# Patient Record
Sex: FEMALE
[De-identification: names, ages, dates, MRNs, and addresses within clinical notes are randomized per-mention and may not be internally consistent; named-entity substitution may affect disease eponyms.]

---

## 2018-04-30 ENCOUNTER — HOSPITAL ENCOUNTER (EMERGENCY)
Dept: HOSPITAL 31 - C.ER | Age: 33
Discharge: HOME | End: 2018-04-30
Payer: COMMERCIAL

## 2018-04-30 VITALS — BODY MASS INDEX: 32.5 KG/M2

## 2018-04-30 VITALS — OXYGEN SATURATION: 98 % | RESPIRATION RATE: 18 BRPM

## 2018-04-30 VITALS — DIASTOLIC BLOOD PRESSURE: 74 MMHG | HEART RATE: 78 BPM | SYSTOLIC BLOOD PRESSURE: 136 MMHG | TEMPERATURE: 98 F

## 2018-04-30 DIAGNOSIS — O26.91: Primary | ICD-10-CM

## 2018-04-30 DIAGNOSIS — Z3A.01: ICD-10-CM

## 2018-04-30 DIAGNOSIS — R10.2: ICD-10-CM

## 2018-04-30 LAB
ALBUMIN SERPL-MCNC: 4 G/DL (ref 3.5–5)
ALBUMIN/GLOB SERPL: 1.2 {RATIO} (ref 1–2.1)
ALT SERPL-CCNC: 14 U/L (ref 9–52)
AST SERPL-CCNC: 25 U/L (ref 14–36)
BACTERIA #/AREA URNS HPF: (no result) /[HPF]
BASOPHILS # BLD AUTO: 0 K/UL (ref 0–0.2)
BASOPHILS NFR BLD: 0.1 % (ref 0–2)
BILIRUB UR-MCNC: NEGATIVE MG/DL
BUN SERPL-MCNC: 9 MG/DL (ref 7–17)
CALCIUM SERPL-MCNC: 8.9 MG/DL (ref 8.6–10.4)
EOSINOPHIL # BLD AUTO: 0 K/UL (ref 0–0.7)
EOSINOPHIL NFR BLD: 0.2 % (ref 0–4)
ERYTHROCYTE [DISTWIDTH] IN BLOOD BY AUTOMATED COUNT: 13.6 % (ref 11.5–14.5)
GFR NON-AFRICAN AMERICAN: > 60
GLUCOSE UR STRIP-MCNC: NORMAL MG/DL
HCG,QUALITATIVE URINE: POSITIVE
HGB BLD-MCNC: 13.7 G/DL (ref 11–16)
LEUKOCYTE ESTERASE UR-ACNC: (no result) LEU/UL
LYMPHOCYTE: 5 % (ref 20–40)
LYMPHOCYTES # BLD AUTO: 0.6 K/UL (ref 1–4.3)
LYMPHOCYTES NFR BLD AUTO: 6.2 % (ref 20–40)
MCH RBC QN AUTO: 31.3 PG (ref 27–31)
MCHC RBC AUTO-ENTMCNC: 36 G/DL (ref 33–37)
MCV RBC AUTO: 87 FL (ref 81–99)
MONOCYTE: 6 % (ref 0–10)
MONOCYTES # BLD: 0.8 K/UL (ref 0–0.8)
MONOCYTES NFR BLD: 8.5 % (ref 0–10)
NEUTROPHILS # BLD: 7.6 K/UL (ref 1.8–7)
NEUTROPHILS NFR BLD AUTO: 85 % (ref 50–75)
NEUTROPHILS NFR BLD AUTO: 88 % (ref 50–75)
NEUTS BAND NFR BLD: 1 % (ref 0–2)
NRBC BLD AUTO-RTO: 0 % (ref 0–2)
PH UR STRIP: 5 [PH] (ref 5–8)
PLATELET # BLD EST: NORMAL 10*3/UL
PLATELET # BLD: 145 K/UL (ref 130–400)
PMV BLD AUTO: 9.8 FL (ref 7.2–11.7)
PROT UR STRIP-MCNC: NEGATIVE MG/DL
RBC # BLD AUTO: 4.38 MIL/UL (ref 3.8–5.2)
RBC # UR STRIP: (no result) /UL
RBC MORPH BLD: NORMAL
SP GR UR STRIP: 1.02 (ref 1–1.03)
SQUAMOUS EPITHIAL: 4 /HPF (ref 0–5)
TOTAL CELLS COUNTED BLD: 100
URINE AMORPHOUS SEDIMENT: (no result) /UL
UROBILINOGEN UR-MCNC: NORMAL MG/DL (ref 0.2–1)
WBC # BLD AUTO: 8.9 K/UL (ref 4.8–10.8)

## 2018-04-30 NOTE — US
PROCEDURE:  First trimester fetal ultrasound presenting with pain 



Beta HCG results: Pending 



HISTORY:

Right pubic pain, preg, no bleeding



COMPARISON:

None



TECHNIQUE:

Standard protocol for this study/examination.



FINDINGS:

LMP: 03/05/2018



Prior examinations from the current pregnancy: None



TECHNIQUE: Real-time 2D imaging, duplex and color Doppler.



FINDINGS:



Cardiac activity: Present



Rate: 169 BPM



Measurements:



Crown rump length: 160   cm



Gestational age based on CRL   8 weeks  



Gestational age 7 weeks 4 days based on gestational sac measurement 

2.72 cm



Gestational age derived from  LMP:   8 weeks



AMIRAH based on LMP: 12/10/2018



AMIRAH based on biometry: 12/11/2018



Gestational concordance documented



Yolk sac  identified 



Uterus: Unremarkable. 



Cervix: No Cervical abnormalities: Negative examination for cervical 

dilatation or effacement. 



Closed cervix measuring 3.08 cm



Subchorionic hemorrhage: None cysts (2). The larger measures 2.5 x 

3.2 x 2.8 cm. 



UTERUS: 7.8 x 8.5 x 10.1 cm. Adnexal 



ADNEXA:



Right: 3.7 x 4.8 x 5.4 cm.   Normal Doppler arterial waveform 

documented.



Left:   1.1 x 2.5 x 2.3 cm.  Normal Doppler arterial waveform 

documented



Fluid in the cul-de-sac: 



IMPRESSION:

Seven weeks 6 days live intrauterine gestation.  Gestational 

concordance documented.

## 2018-04-30 NOTE — C.PDOC
History Of Present Illness


33-year-old female, presents to the emergency department with complaints of 

pelvic cramping. Patient is reportedly 8 weeks pregnant (), LMP 3/5


comes in complaining of pelvic cramping pain since last night, which is worse 

on right, Pain is intermittent in nature and non-radiating. Patient notes 

associated low grade fever last night, and reports a slight headache and non-

productive cough x2 days. Patient denies bleeding, hematuria. 


Time Seen by Provider: 18 09:45


Chief Complaint (Nursing): Fever


History Per: Patient


History/Exam Limitations: no limitations


Onset/Duration Of Symptoms: Days


Current Symptoms Are (Timing): Still Present


Severity: Moderate





Past Medical History


Reviewed: Historical Data, Nursing Documentation, Vital Signs


Vital Signs: 


 Last Vital Signs











Temp  99.5 F   18 09:10


 


Pulse  99 H  18 09:10


 


Resp  18   18 09:10


 


BP  119/81   18 09:10


 


Pulse Ox  98   18 10:50














- Medical History


PMH: Gastrointestinal Ulcer (??), Gall Bladder Disease (gallstones)


Family History: States: No Known Family Hx





- Social History


Hx Tobacco Use: No


Hx Alcohol Use: No


Hx Substance Use: No





- Immunization History


Hx Tetanus Toxoid Vaccination: No


Hx Influenza Vaccination: No


Hx Pneumococcal Vaccination: No





Review Of Systems


Constitutional: Positive for: Fever


Respiratory: Positive for: Cough.  Negative for: Sputum


Gastrointestinal: Negative for: Vomiting


Genitourinary: Positive for: Pelvic Pain.  Negative for: Dysuria, Vaginal 

Discharge, Vaginal Bleeding


Neurological: Positive for: Headache





Physical Exam





- Physical Exam


Appears: Non-toxic, No Acute Distress


Skin: Normal Color, Warm, Dry, No Rash


Head: Normacephalic


Eye(s): bilateral: PERRL


Oral Mucosa: Moist


Lips: Normal Appearing


Neck: Normal ROM


Cardiovascular: Rhythm Regular, No Murmur


Respiratory: Normal Breath Sounds, No Accessory Muscle Use


Gastrointestinal/Abdominal: Soft, Tenderness (suprapubic, R>L)


Extremity: Normal ROM, No Deformity, No Swelling


Neurological/Psych: Oriented x3, Normal Speech





ED Course And Treatment





- Laboratory Results


Result Diagrams: 


 18 09:57





 18 09:57


O2 Sat by Pulse Oximetry: 98 (RA)


Pulse Ox Interpretation: Normal





- CT Scan/US


  ** OB transvaginal 


Other Rad Studies (CT/US): Read By Radiologist, Radiology Report Reviewed


CT/US Interpretation: Accession No. : [id number].  Patient Name / ID : 

PETE GOFF  / [id number].  Exam Date : 2018 11:13:23 ( Approved ).  Study 

Comment :  Sex / Age : F  / 33Y.  Creator : Riky Ann MD.  Dictator 

: Riky Ann MD.   :  Approver : Riky Ann MD.  

Approver2 :  Report Date : 2018 12:02:48.  My Comment :  *****************

******************************************************************.  PROCEDURE:

  First trimester fetal ultrasound presenting with pain.  Beta HCG results: 

Pending.  HISTORY:  Right pubic pain, preg, no bleeding.  COMPARISON:  None.  

TECHNIQUE:  Standard protocol for this study/examination.  FINDINGS:  LMP: .  Prior examinations from the current pregnancy: None.  TECHNIQUE: Real-

time 2D imaging, duplex and color Doppler.  FINDINGS:  Cardiac activity: 

Present.  Rate: 169 BPM.  Measurements:  Crown rump length: 160   cm.  

Gestational age based on CRL   8 weeks.  Gestational age 7 weeks 4 days based 

on gestational sac measurement 2.72 cm.  Gestational age derived from  LMP:   8 

weeks.  AMIRAH based on LMP: 12/10/2018.  AMIRAH based on biometry: 2018.  

Gestational concordance documented.  Yolk sac  identified.  Uterus: 

Unremarkable.  Cervix: No Cervical abnormalities: Negative examination for 

cervical dilatation or effacement.  Closed cervix measuring 3.08 cm.  

Subchorionic hemorrhage: None cysts (2). The larger measures 2.5 x 3.2 x 2.8 

cm.  UTERUS: 7.8 x 8.5 x 10.1 cm. Adnexal.  ADNEXA:  Right: 3.7 x 4.8 x 5.4 cm.

   Normal Doppler arterial waveform documented.  Left:   1.1 x 2.5 x 2.3 cm.  

Normal Doppler arterial waveform documented.  Fluid in the cul-de-sac:  

IMPRESSION:  Seven weeks 6 days live intrauterine gestation.  Gestational 

concordance documented.





Medical Decision Making


Medical Decision Making: 


Impression: pelvic pain


Differential diagnosis includes but not limited to: ectopic pregnancy, 

threatened , cystitis





Plan:


* Labs


* CBC


* Tylenol


* Urine Culture








Progress:


US shows Seven weeks 6 days live [place]ine gestation. [facility] [place] [address]


Patient remained afebrile and on re-evaluation she was sitting comfortably and 

reports pain has resolved. Vital signs stable. Discussed results with patient, 

and copy of lab and US report was provided. Patient feels comfortable going 

home and will be discharged. Patient given follow up instructions to see ob/gyn 

within one week. Instructed to return to ER if symptoms worsen or new symptoms 

arise.








Disposition


Counseled Patient/Family Regarding: Diagnosis, Need For Followup





- Disposition


Referrals: 


Nathan Chawla MD [Staff Provider] - 


Disposition: HOME/ ROUTINE


Disposition Time: 12:30


Condition: IMPROVED


Additional Instructions: 


Your labs were normal and Ultrasound shows pregnancy 7 weeks 6 days


Follow up with your ob/gyn in timely manner


Take Tylenol for any pain


Return to the emergency department at any time if symptoms persist or worsen.


Instructions:  Threatened Miscarriage (DC)


Forms:  [facility] (English)





- POA


Present On Arrival: None





- Clinical Impression


Clinical Impression: 


 Pelvic pain during pregnancy








- Scribe Statement


The provider has reviewed the documentation as recorded by the Scribe (Crista Forde)


All medical record entries made by the Scribe were at my direction and 

personally dictated by me. I have reviewed the chart and agree that the record 

accurately reflects my personal performance of the history, physical exam, 

medical decision making, and the department course for this patient. I have 

also personally directed, reviewed, and agree with the discharge instructions 

and disposition.

## 2018-04-30 NOTE — C.PDOC
Time Seen by Provider: 04/30/18 09:45


Chief Complaint (Nursing): Fever





Past Medical History


Vital Signs: 





 Last Vital Signs











Temp  99.5 F   04/30/18 09:10


 


Pulse  99 H  04/30/18 09:10


 


Resp  18   04/30/18 09:10


 


BP  119/81   04/30/18 09:10


 


Pulse Ox  98   04/30/18 09:10














- Medical History


PMH: Gastrointestinal Ulcer (??), Gall Bladder Disease (gallstones)


Family History: States: Unknown Family Hx





- Social History


Hx Tobacco Use: No


Hx Alcohol Use: No


Hx Substance Use: No





- Immunization History


Hx Tetanus Toxoid Vaccination: No


Hx Influenza Vaccination: No


Hx Pneumococcal Vaccination: No





ED Course And Treatment


O2 Sat by Pulse Oximetry: 98





Disposition





- Disposition

## 2018-07-06 ENCOUNTER — HOSPITAL ENCOUNTER (EMERGENCY)
Dept: HOSPITAL 31 - C.ER | Age: 33
Discharge: HOME | End: 2018-07-06
Payer: SELF-PAY

## 2018-07-06 VITALS
RESPIRATION RATE: 18 BRPM | TEMPERATURE: 98.4 F | SYSTOLIC BLOOD PRESSURE: 124 MMHG | DIASTOLIC BLOOD PRESSURE: 85 MMHG | HEART RATE: 75 BPM

## 2018-07-06 VITALS — BODY MASS INDEX: 34 KG/M2

## 2018-07-06 VITALS — OXYGEN SATURATION: 99 %

## 2018-07-06 DIAGNOSIS — O02.1: Primary | ICD-10-CM

## 2018-07-06 LAB
ALBUMIN SERPL-MCNC: 4.2 [, G/DL] (ref 3.5–5)
ALBUMIN/GLOB SERPL: 1.4 [,] (ref 1–2.1)
ALT SERPL-CCNC: 21 [, U/L] (ref 9–52)
AST SERPL-CCNC: 22 [, U/L] (ref 14–36)
BACTERIA #/AREA URNS HPF: (no result) [,]
BASOPHILS # BLD AUTO: 0 [, K/UL] (ref 0–0.2)
BASOPHILS NFR BLD: 0.3 [, %] (ref 0–2)
BILIRUB UR-MCNC: NEGATIVE [,]
BUN SERPL-MCNC: 8 [, MG/DL] (ref 7–17)
CALCIUM SERPL-MCNC: 9.1 [, MG/DL] (ref 8.6–10.4)
EOSINOPHIL # BLD AUTO: 0.1 [, K/UL] (ref 0–0.7)
EOSINOPHIL NFR BLD: 1.1 [, %] (ref 0–4)
ERYTHROCYTE [DISTWIDTH] IN BLOOD BY AUTOMATED COUNT: 13.3 [, %] (ref 11.5–14.5)
GFR NON-AFRICAN AMERICAN: > 60 [,]
GLUCOSE UR STRIP-MCNC: NORMAL [, MG/DL]
HGB BLD-MCNC: 13 [, G/DL] (ref 11–16)
LEUKOCYTE ESTERASE UR-ACNC: (no result) [, LEU/UL]
LYMPHOCYTES # BLD AUTO: 1.9 [, K/UL] (ref 1–4.3)
LYMPHOCYTES NFR BLD AUTO: 24 [, %] (ref 20–40)
MCH RBC QN AUTO: 30.7 [, PG] (ref 27–31)
MCHC RBC AUTO-ENTMCNC: 35.2 [, G/DL] (ref 33–37)
MCV RBC AUTO: 87 [, FL] (ref 81–99)
MONOCYTES # BLD: 0.5 [, K/UL] (ref 0–0.8)
MONOCYTES NFR BLD: 6.3 [, %] (ref 0–10)
NEUTROPHILS # BLD: 5.4 [, K/UL] (ref 1.8–7)
NEUTROPHILS NFR BLD AUTO: 68.3 [, %] (ref 50–75)
NRBC BLD AUTO-RTO: 0 [, %] (ref 0–2)
PH UR STRIP: 5 [,] (ref 5–8)
PLATELET # BLD: 171 [, K/UL] (ref 130–400)
PMV BLD AUTO: 9.6 [, FL] (ref 7.2–11.7)
PROT UR STRIP-MCNC: NEGATIVE [, MG/DL]
RBC # BLD AUTO: 4.24 [, MIL/UL] (ref 3.8–5.2)
RBC # UR STRIP: (no result) [,]
SP GR UR STRIP: 1.01 [,] (ref 1–1.03)
SQUAMOUS EPITHIAL: 21 [, /HPF] (ref 0–5)
UROBILINOGEN UR-MCNC: NORMAL [, MG/DL] (ref 0.2–1)
WBC # BLD AUTO: 7.9 [, K/UL] (ref 4.8–10.8)

## 2018-07-06 NOTE — C.PDOC
History Of Present Illness





<Elmira Pelaez TOD - Last Filed: 18 14:51>





<Tori Hameed - Last Filed: 18 15:44>


32 y/o female  currently 17 weeks pregnant presents to ED sent from clinic 

after Dr. Chawla was unable to obtain fetal heart tones. At ED patient reports 

vaginal spotting "for few days" and denies abdominal pain, nausea, vomiting, 

back pain, vaginal discharge or any other complaints at this time.  (Elmira Pelaez)


History Per: Patient


History/Exam Limitations: no limitations


Onset/Duration Of Symptoms: Days


Current Symptoms Are (Timing): Still Present





<PelaezElmira TOD - Last Filed: 18 14:51>





<Tori Hameed - Last Filed: 18 15:44>


Time Seen by Provider: 18 12:17


Chief Complaint (Nursing): Female Genitourinary





Past Medical History


Reviewed: Historical Data, Nursing Documentation, Vital Signs





- Medical History


PMH: Gastrointestinal Ulcer (??), Gall Bladder Disease (gallstones)


Surgical History: No Surg Hx


Family History: States: No Known Family Hx





- Social History


Hx Tobacco Use: No


Hx Alcohol Use: No


Hx Substance Use: No





- Immunization History


Hx Tetanus Toxoid Vaccination: No


Hx Influenza Vaccination: No


Hx Pneumococcal Vaccination: No





<PelaezElmira toro - Last Filed: 18 14:51>


Vital Signs: 





 Last Vital Signs











Temp  98.4 F   18 14:35


 


Pulse  75   18 14:35


 


Resp  18   18 14:35


 


BP  124/85   18 14:35


 


Pulse Ox  99   18 14:53














Review Of Systems


Constitutional: Negative for: Fever, Chills


Gastrointestinal: Negative for: Nausea, Vomiting, Abdominal Pain


Genitourinary: Positive for: Vaginal Bleeding.  Negative for: Dysuria


Skin: Negative for: Rash





<PelaezElmira TOD - Last Filed: 18 14:51>





Physical Exam





- Physical Exam


Appears: Non-toxic, No Acute Distress


Skin: Warm, Dry, No Rash


Head: Atraumatic, Normacephalic


Eye(s): bilateral: Normal Inspection


Oral Mucosa: Moist


Neck: Normal ROM, Supple


Cardiovascular: Rhythm Regular


Respiratory: Normal Breath Sounds, No Rales, No Rhonchi, No Wheezing


Gastrointestinal/Abdominal: Soft, No Tenderness, No Guarding, No Rebound


Back: No CVA Tenderness, No Paraspinal Tenderness


Extremity: Bilateral: Atraumatic


Neurological/Psych: Oriented x3, Normal Speech, Normal Cognition





<BenignoElmira - Last Filed: 18 14:51>





ED Course And Treatment





- Laboratory Results


Result Diagrams: 


 18 12:38





 18 12:38


O2 Sat by Pulse Oximetry: 99 (RA)


Pulse Ox Interpretation: Normal





<Elmira Pelaez - Last Filed: 18 14:51>





- Laboratory Results


Result Diagrams: 


 18 12:38





 18 12:38





<Tori Hameed - Last Filed: 18 15:44>





Medical Decision Making





<Elmira Pelaez - Last Filed: 18 14:51>





<Tori Hameed - Last Filed: 18 15:44>


Medical Decision Making: 


Impression: 





Plan:


* Beta HCG


* UA


* OB pregnancy US


* IV fluids 








Progress:  (Elmira Pelaez)





Disposition


Discussed With : Tori Hameed (patient stable for discharge and outpatient D&C)


Counseled Patient/Family Regarding: Diagnosis, Need For Followup





- Disposition


Disposition Time: 13:54





- POA


Present On Arrival: None





<Elmira Pelaez - Last Filed: 18 14:51>





<Tori Hameed - Last Filed: 18 15:44>





- Disposition


Referrals: 


[facility] [Outside]


Disposition: HOME/ ROUTINE


Condition: STABLE


Additional Instructions: 


Please follow up outpatient as discussed with OB/GYN for D&C


Take Tylenol or Motrin for any pain and allow rest


Return to the emergency department at any time if symptoms persist or worsen.


Instructions:  Miscarriage (DC)


Forms:  [facility] (English)





- Clinical Impression


Clinical Impression: 


 Fetal demise, less than 22 weeks








- PA / NP / Resident Statement


MD/DO has reviewed & agrees with the documentation as recorded.





- Scribe Statement


The provider has reviewed the documentation as recorded by the Scribe





<Elmira Pelaez - Last Filed: 18 14:51>





<ZariTori - Last Filed: 18 15:44>





- Scribe Statement


Ivonne Alfaro





All medical record entries made by the Scribe were at my direction and 

personally dictated by me. I have reviewed the chart and agree that the record 

accurately reflects my personal performance of the history, physical exam, 

medical decision making, and the department course for this patient. I have 

also personally directed, reviewed, and agree with the discharge instructions 

and disposition. (Elmira Pelaez)

## 2018-07-06 NOTE — CP.PCM.CON
History of Present Illness





- History of Present Illness


History of Present Illness: 


Pt is 33  @ 17weeks by her EDC 12/10. Pt was seen in the clinic today 

and the ob/gyn was unable to detect fetal heart tones. The patient was sent to 

the ER for confirmation of fetal heart tones. Pt denies vaginal bleeding or 

pelvic pain. Pt states that she had been spotting throughout the pregnancy. 


PNC: with [facility]. No missed appointments


POBHx:   FT.


     FT


PGYNHx: not hx of STDs


PMHx: none


PSHx: none


Social: negative x 3 


MEDS: PNV


ALL: NKDA. 








Review of Systems





- Review of Systems


All systems: reviewed and no additional remarkable complaints except





- Constitutional


Constitutional: As Per HPI





- EENT


Eyes: As Per HPI


Ears: As Per HPI


Nose/Mouth/Throat: As Per HPI





- Breasts


Breasts: As Per HPI





- Cardiovascular


Cardiovascular: As Per HPI





- Respiratory


Respiratory: As Per HPI





- Gastrointestinal


Gastrointestinal: As Per HPI





- Genitourinary


Genitourinary: As Per HPI





- Reproductive: Female


Reproductive:Female: As Per HPI





- Menstruation


Menstruation: As Per HPI





- Musculoskeletal


Musculoskeletal: As Per HPI





- Integumentary


Integumentary: As Per HPI





- Neurological


Neurological: As Per HPI





- Psychiatric


Psychiatric: As Per HPI





- Endocrine


Endocrine: As Per HPI





- Hematologic/Lymphatic


Hematologic: As Per HPI





Past Patient History





- Infectious Disease


Hx of Infectious Diseases: None





- Tetanus Immunizations


Tetanus Immunization: Unknown





- Past Medical History & Family History


Past Medical History?: No


Past Family History: Reviewed and not pertinent





- Past Social History


Smoking Status: Never Smoked


Chewing Tobacco Use: No


Cigar Use: No


Alcohol: None





- CARDIAC


Hx Cardiac Disorders: No


Hx Angina: No


Hx Atrial Fibrillation: No


Hx Cardia Arrhythmia: No


Hx Circulatory Problems: No


Hx Congestive Heart Failure: No


Hx Heart Attack: No


Hx Heart Murmur: No


Hx Heart Transplant: No


Hx Hypercholesterolemia: No


Hx Hypertension: No


Hx Hypotension: No


Hx Internal Defibrillator: No


Hx Mitral Valve Prolapse: No





- GASTROINTESTINAL


Hx Bowel Surgery: No


Hx Clostridium Difficile: No


Hx Colitis: No


Hx Colostomy: No


Hx Constipation: No


Hx Crohn's Disease: No


Hx Diarrhea: No


Hx Diverticulitis: No


Hx Esophageal Varices: No


Hx Fatty Liver Disease: No


Hx Gall Bladder Disease: No (gallstones)


Hx Gastritis: No


Hx Gastroesophageal Reflux: No


Hx Ileostomy: No


Hx Irritable Bowel: No


Hx Liver Failure: No


Hx Nausea: No


Hx Pancreatitis: No


HX Swallowing Problems: No


Hx Ulcer: No


Hx Vomiting: No





- GENITOURINARY/GYNECOLOGICAL


Hx Genitourinary Disorders: No


Hx Bladder Cancer: No


Hx Bladder Stone: No


Hx Hematuria: No


Hx Incontinence: No


Hx Ovarian Cancer: No


Hx Postmenopausal Bleeding: No


Hx Reproductive Disorders: No


Hx Sexually Transmitted Disorders: No


Hx Uterine Cancer: No


Hx Urinary Tract Infection: No





- PSYCHIATRIC


Hx Psychophysiologic Disorder: No


Hx Anxiety: No


Hx Bipolar Disorder: No


Hx Depression: No


Hx Emotional Abuse: No


Hx Hallucinations: No


Hx Panic Symptoms: No


Hx Paranoia: No


Hx Post Traumatic Stress Disorder: No


Hx Psychosis: No


Hx Physical Abuse: No


Hx Schizophrenia: No


Hx Sexual Abuse: No


Hx Substance Use: No





- SURGICAL HISTORY


Hx Surgeries: No





- ANESTHESIA


Hx Anesthesia: No





Meds


Allergies/Adverse Reactions: 


 Allergies











Allergy/AdvReac Type Severity Reaction Status Date / Time


 


No Known Allergies Allergy   Verified 18 12:27














Physical Exam





- Constitutional


Appears: Well





- Head Exam


Head Exam: ATRAUMATIC, NORMAL INSPECTION





- Eye Exam


Eye Exam: Normal appearance





- ENT Exam


ENT Exam: Mucous Membranes Moist, Normal Exam





- Neck Exam


Neck exam: Positive for: Normal Inspection





- Respiratory Exam


Respiratory Exam: NORMAL BREATHING PATTERN





- Cardiovascular Exam


Cardiovascular Exam: REGULAR RHYTHM





- Rectal Exam


Rectal Exam: NORMAL INSPECTION





-  Exam


 Exam: NORMAL INSPECTION


External exam: NORMAL EXTERNAL EXAM





- Back Exam


Back exam: NORMAL INSPECTION





- Neurological Exam


Neurological exam: Normal Gait





- Psychiatric Exam


Psychiatric exam: Normal Affect, Normal Mood





- Skin


Skin Exam: Normal Color





Results





- Vital Signs


Recent Vital Signs: 


 Last Vital Signs











Temp  98.4 F   18 14:35


 


Pulse  75   18 14:35


 


Resp  18   18 14:35


 


BP  124/85   18 14:35


 


Pulse Ox  99   18 14:53














- Labs


Result Diagrams: 


 18 12:38





 18 12:38


Labs: 


 Laboratory Results - last 24 hr











  18





  12:38 12:38 12:38


 


WBC  7.9  


 


RBC  4.24  


 


Hgb  13.0  


 


Hct  36.9  


 


MCV  87.0  


 


MCH  30.7  


 


MCHC  35.2  


 


RDW  13.3  


 


Plt Count  171  


 


MPV  9.6  


 


Neut % (Auto)  68.3  


 


Lymph % (Auto)  24.0  


 


Mono % (Auto)  6.3  


 


Eos % (Auto)  1.1  


 


Baso % (Auto)  0.3  


 


Neut # (Auto)  5.4  


 


Lymph # (Auto)  1.9  


 


Mono # (Auto)  0.5  


 


Eos # (Auto)  0.1  


 


Baso # (Auto)  0.0  


 


Sodium    139


 


Potassium    4.4


 


Chloride    103


 


Carbon Dioxide    25


 


Anion Gap    15


 


BUN    8


 


Creatinine    0.6 L


 


Est GFR ( Amer)    > 60


 


Est GFR (Non-Af Amer)    > 60


 


Random Glucose    97


 


Calcium    9.1


 


Total Bilirubin    0.9


 


AST    22


 


ALT    21


 


Alkaline Phosphatase    47


 


Total Protein    7.4


 


Albumin    4.2


 


Globulin    3.1


 


Albumin/Globulin Ratio    1.4


 


Beta HCG, Quant    281.21


 


Urine Color   Yellow 


 


Urine Clarity   Hazy 


 


Urine pH   5.0 


 


Ur Specific Gravity   1.015 


 


Urine Protein   Negative 


 


Urine Glucose (UA)   Normal 


 


Urine Ketones   Negative 


 


Urine Blood   2+ H 


 


Urine Nitrate   Negative 


 


Urine Bilirubin   Negative 


 


Urine Urobilinogen   Normal 


 


Ur Leukocyte Esterase   3+ H 


 


Urine WBC (Auto)   34 H 


 


Urine RBC (Auto)   10 H 


 


Ur Squamous Epith Cells   21 H 


 


Urine Bacteria   Rare 














Assessment & Plan





- Assessment and Plan (Free Text)


Assessment: 


A/P: Pt is 33  @ 17  by EDC presents to the ER with missed . 


1


Plan: 





1) Vital stable. 


2) Pelvic sono: Missed . 


3) Type and screen pending.


4) After long discussion with the patient regarding management option the pt 

chose to opt for surgical management. Since the patient is in the 2nd trimester 

she will be referred to [facility] in [place] for second trimester suction D&C


5) Discharge pt

## 2018-07-06 NOTE — US
PROCEDURE:  OB Pelvic Ultrasound



HISTORY:

17 wk preg no FHT on doppler



COMPARISON:

04/30/2018



FINDINGS:



UTERUS:

Gestational sac: Single intrauterine gestation.



Fetal Heart rate: No cardiac activity documented. 



Fetal age (Ultrasound estimated): 13 weeks 6 days



Brianda-gestational hemorrhage: None.



Date of delivery (Ultrasound estimated) : 01/05/2019



CERVIX:

Measures 2.92 cm. Closed. No cervical abnormality seen.



FREE FLUID:

None.



OTHER FINDINGS:

None. 



IMPRESSION:

Intrauterine fetal demise. 



Thirteen weeks intrauterine gestation breech presentation and 

anterior placenta.



Four weeks gestational discordance noted. On the prior study 

gestational concordance was apparent.

## 2018-07-09 ENCOUNTER — HOSPITAL ENCOUNTER (EMERGENCY)
Dept: HOSPITAL 31 - C.ER | Age: 33
Discharge: HOME | End: 2018-07-09
Payer: MEDICAID

## 2018-07-09 VITALS — HEART RATE: 76 BPM | DIASTOLIC BLOOD PRESSURE: 71 MMHG | SYSTOLIC BLOOD PRESSURE: 107 MMHG | TEMPERATURE: 99 F

## 2018-07-09 VITALS — RESPIRATION RATE: 20 BRPM

## 2018-07-09 VITALS — OXYGEN SATURATION: 96 %

## 2018-07-09 VITALS — BODY MASS INDEX: 34 KG/M2

## 2018-07-09 DIAGNOSIS — O02.1: Primary | ICD-10-CM

## 2018-07-09 DIAGNOSIS — Z3A.13: ICD-10-CM

## 2018-07-09 LAB
ERYTHROCYTE [DISTWIDTH] IN BLOOD BY AUTOMATED COUNT: 13.2 % (ref 11.5–14.5)
HGB BLD-MCNC: 13 G/DL (ref 11–16)
MCH RBC QN AUTO: 30.7 PG (ref 27–31)
MCHC RBC AUTO-ENTMCNC: 35.7 G/DL (ref 33–37)
MCV RBC AUTO: 85.9 FL (ref 81–99)
PLATELET # BLD: 178 K/UL (ref 130–400)
PMV BLD AUTO: 9.8 FL (ref 7.2–11.7)
RBC # BLD AUTO: 4.23 MIL/UL (ref 3.8–5.2)
WBC # BLD AUTO: 8.4 K/UL (ref 4.8–10.8)

## 2018-07-09 NOTE — US
PROCEDURE:  OB Pelvic Ultrasound



HISTORY:

VAG BLEEDING R/O RETAINED POC.



COMPARISON:

07/06/2018.



FINDINGS:



UTERUS:

Single intrauterine fetus in breech presentation.



BPD: 1.79 cm corresponding to 12 weeks and 5 days of gestational age. 



HC: 7.37 cm corresponding to 13 weeks and 0 days of gestational age. 



AC: 7.24 cm corresponding with 13 weeks and 5 days of gestational 

age. 



FL: 1.13 cm corresponding to 13 weeks and 2 days of gestational age. 



Fetal age (Ultrasound estimated): 13 weeks and 1 day



Date of delivery (Ultrasound estimated) : 01/13/2019



No fetal cardiac activity is documented on the current examination.



Brianda-gestational hemorrhage: None.



Placenta is anterior.



CERVIX:

Long and closed. No cervical abnormality seen. 



FREE FLUID:

None.



OTHER FINDINGS:

None. 



IMPRESSION:

Single intrauterine fetus in breech presentation with mean 

gestational age of 13 weeks and 1 day. No fetal cardiac activity is 

documented on the current examination conforming known fetal demise.

## 2018-07-09 NOTE — C.PDOC
History Of Present Illness


33-YEAR-OLD FEMALE, PRESENTS TO THE EMERGENCY DEPARTMENT WITH COMPLAINTS OF NEW 

ONSET VB, CRAMPING X 2 DAYS. SEEN  FOR SAME BUT WAS ASYMPT AT TIME. S/P MIREYA FINLEY WAS TOLD "I HAVE A WEEK TO THINK ABOUT HAVING A PROCEDURE NO ONE SAID 

I COULD HAVE ONE THEN". REFERRED BY DR HUNT FOR [facility].





EXAM


NAD


NEG


Time Seen by Provider: 18 11:43


Chief Complaint (Nursing): Abdominal Pain


History Per: Patient


History/Exam Limitations: no limitations


Current Symptoms Are (Timing): Still Present





Past Medical History


Reviewed: Historical Data, Nursing Documentation, Vital Signs


Vital Signs: 


 Last Vital Signs











Temp  98 F   18 11:35


 


Pulse  81   18 11:35


 


Resp  20   18 11:35


 


BP  108/73   18 11:35


 


Pulse Ox  100   18 14:03














- Medical History


PMH: Gastrointestinal Ulcer (??)


Family History: States: No Known Family Hx





- Social History


Hx Tobacco Use: No


Hx Alcohol Use: No


Hx Substance Use: No





- Immunization History


Hx Tetanus Toxoid Vaccination: No


Hx Influenza Vaccination: No


Hx Pneumococcal Vaccination: No





Review Of Systems


Constitutional: Negative for: Fever, Chills


Cardiovascular: Negative for: Chest Pain


Respiratory: Negative for: Shortness of Breath


Gastrointestinal: Negative for: Nausea, Vomiting


Genitourinary: Positive for: Vaginal Bleeding


Musculoskeletal: Negative for: Back Pain


Skin: Negative for: Rash


Neurological: Negative for: Weakness, Numbness, Headache, Dizziness





Physical Exam





- Physical Exam


Appears: Non-toxic, No Acute Distress


Skin: Normal Color, Warm, Dry, No Rash


Head: Atraumatic, Normacephalic


Eye(s): bilateral: Normal Inspection


Nose: Normal


Oral Mucosa: Moist


Lips: Normal Appearing


Neck: Normal ROM


Cardiovascular: Rhythm Regular, No Murmur


Respiratory: Normal Breath Sounds, No Accessory Muscle Use


Gastrointestinal/Abdominal: Soft, No Tenderness, No Guarding, No Rebound


Extremity: Normal ROM, No Deformity, No Swelling


Neurological/Psych: Oriented x3, Normal Speech





ED Course And Treatment





- Laboratory Results


Result Diagrams: 


 18 12:26





O2 Sat by Pulse Oximetry: 100 (RA)


Pulse Ox Interpretation: Normal





Progress





- Re-Evaluation


Re-evaluation Note: 





18 12:28


D/W DR MARY JO OBG[facility] ON CALL. AWARE OF ER FINDINGS, WILL EVAL. LABS, US PENDING.


18 13:52


US SHOWING RETAINED POC, AS PER RADIOLOGIST DR ALEXANDER


18 15:18


SP EVAL DR CAMARGO, STATES PT NOW SAYS TRIED TO FU @ [place] BUT INSURANCE IS 

NOT ACTIVE AND DOES NOT WANT TO PAY OUT OF POCKET. 





AT BEDSIDE W DR CAMARGO, D/W PT AND  MANAGEMENT PLAN. PER DR CAMARGO, PT 

REQUIRES PROBABLE D&E BUT DOES NOT PERFORM THIS PROCEDURE. DR CAMARGO STATES 

ALSO HAS D/W DR EID WHO IS INCOMING OBGYN, ALSO DOES NOT PERFORM THIS 

PROCEDURE. PT AND FAMILY ADVISED BY DR CAMARGO RELEVANT EXPERT[place] 

[place] [facility] [place]  STATES VERBAL UNDERSTANDING OF PT'S NEED FOR 

APPROPRIATE LEVEL OF SERVICE FOR HER CONDITION AND UNDERSTANDS THAT THIS 

SERVICE IS NOT AVAILABLE AT [facility].





NO S/S SEPSIS, ACUTE ABD. LABS NO SIG CHANGE FROM PRIOR. MED CLEAR FOR DC. 

INFORMATION GIVEN TO PT AND , WHO STATE THEY [place] 

[facility] [place] 





- Data Reviewed


Data Reviewed: Lab, Diagnostic imaging, Old records





Disposition


Counseled Patient/Family Regarding: Studies Performed, Diagnosis, Need For 

Followup





- Disposition


Referrals: 


[facility] [place]


Disposition: HOME/ ROUTINE


Disposition Time: 15:13


Condition: GOOD


Instructions:  Miscarriage (DC)


Forms:  [facility] (English)





- Clinical Impression


Clinical Impression: 


 Missed 








- Scribe Statement


The provider has reviewed the documentation as recorded by the Scribe (Crista Forde)








All medical record entries made by the Scribe were at my direction and 

personally dictated by me. I have reviewed the chart and agree that the record 

accurately reflects my personal performance of the history, physical exam, 

medical decision making, and the department course for this patient. I have 

also personally directed, reviewed, and agree with the discharge instructions 

and disposition.

## 2018-07-09 NOTE — C.PDOC
Time Seen by Provider: 18 11:43


Chief Complaint (Nursing): Abdominal Pain


Onset/Duration Of Symptoms: Days (2)


Current Symptoms Are (Timing): Still Present


Severity: Mild


Quality Of Discomfort: Cramping


Abnormal Vaginal Bleeding: Yes


: 4


Para: 2





Past Medical History


Vital Signs: 





 Last Vital Signs











Temp  99.0 F   18 15:23


 


Pulse  76   18 15:23


 


Resp  20   18 15:23


 


BP  107/71   18 15:23


 


Pulse Ox  96   18 15:23














- Medical History


PMH: Gastrointestinal Ulcer (??)


   Denies: Anxiety, Atrial Fibrillation, Bipolar Disorder, Cardia Arrhythmia, 

CHF, Crohn's Disease, Depression, Diverticulitis, Gastritis, Gall Bladder 

Disease (gallstones), HTN, Hypercholesterolemia, Mitral Valve Prolapse, 

Pancreatitis, Paranoia, Post Traumatic Stress Disorder, Schizophrenia, Sexually 

Transmitted Disease


Surgical History: No Surg Hx


Family History: States: No Known Family Hx, Unknown Family Hx





- Social History


Hx Tobacco Use: No


Hx Alcohol Use: No


Hx Substance Use: No





- Immunization History


Hx Tetanus Toxoid Vaccination: No


Hx Influenza Vaccination: No


Hx Pneumococcal Vaccination: No





Review Of Systems


Genitourinary: Positive for: Vaginal Bleeding, Other (Spotting and cramping)





ED Course And Treatment





- Laboratory Results


Result Diagrams: 


 18 12:26





Lab Interpretation: Normal


O2 Sat by Pulse Oximetry: 96


Progress Note: this is a 34 y/o Female  at 17+ wks by First trimester US, 

13.6 wks by US done on 18 with fetal demise presented to ED with c/o 

Crapming and spotting. Pt was seen in the ED 18 and diagnosed with Miised 

BA and was instructed to f/u at [facility] for D&E. However 

she found out that it will cost her $1000.00 and didn't make korin. She had her f/

u with the [facility] today and she was sent by her PMD again to the ED for D

&E. I discussed with the presence of the ED physician that she is too further 

along and Needs the D&E and non on the OB physicians are qualified here at 

[facility]. She was given numer to [facility] to walk in 

today to make korin and schedule Surgery throuh them. At the begening the pt and 

her  was upset about the situatipon but then understood the specialiezed 

procedure that she needs cannot be done here. Discussed her LAbs and Samantha signs 

with the the pt and her  and resured that she is stable at this time. 

However this may change anytime and needs attention with the few days.





Disposition





- Disposition


Referrals: 


[facility] [place] [Other]


Disposition Time: 03:30


Condition: GOOD


Additional Instructions: 


Pt is to f/u with the [facility] to make korin to schedule surgery


Pt's  already called the center for korin.





Instructions:  Miscarriage (DC)


Forms:  [facility] (English)





- Clinical Impression


Clinical Impression: 


 Missed

## 2019-04-08 ENCOUNTER — HOSPITAL ENCOUNTER (EMERGENCY)
Dept: HOSPITAL 31 - C.ER | Age: 34
Discharge: HOME | End: 2019-04-08
Payer: MEDICAID

## 2019-04-08 VITALS — BODY MASS INDEX: 34 KG/M2

## 2019-04-08 VITALS
SYSTOLIC BLOOD PRESSURE: 112 MMHG | OXYGEN SATURATION: 99 % | HEART RATE: 82 BPM | DIASTOLIC BLOOD PRESSURE: 77 MMHG | RESPIRATION RATE: 18 BRPM | TEMPERATURE: 98.5 F

## 2019-04-08 DIAGNOSIS — R10.9: ICD-10-CM

## 2019-04-08 DIAGNOSIS — Z3A.10: ICD-10-CM

## 2019-04-08 DIAGNOSIS — O26.891: Primary | ICD-10-CM

## 2019-04-08 LAB
ALBUMIN SERPL-MCNC: 3.9 G/DL (ref 3.5–5)
ALBUMIN/GLOB SERPL: 1.3 {RATIO} (ref 1–2.1)
ALT SERPL-CCNC: 10 U/L (ref 9–52)
AST SERPL-CCNC: 19 U/L (ref 14–36)
BASOPHILS # BLD AUTO: 0 K/UL (ref 0–0.2)
BASOPHILS NFR BLD: 0.3 % (ref 0–2)
BILIRUB UR-MCNC: NEGATIVE MG/DL
BUN SERPL-MCNC: 10 MG/DL (ref 7–17)
CALCIUM SERPL-MCNC: 9.1 MG/DL (ref 8.6–10.4)
CAOX CRY #/AREA URNS HPF: (no result) /HPF
EOSINOPHIL # BLD AUTO: 0 K/UL (ref 0–0.7)
EOSINOPHIL NFR BLD: 0.5 % (ref 0–4)
ERYTHROCYTE [DISTWIDTH] IN BLOOD BY AUTOMATED COUNT: 14.4 % (ref 11.5–14.5)
GFR NON-AFRICAN AMERICAN: > 60
GLUCOSE UR STRIP-MCNC: NORMAL MG/DL
HGB BLD-MCNC: 12.9 G/DL (ref 11–16)
LEUKOCYTE ESTERASE UR-ACNC: (no result) LEU/UL
LYMPHOCYTES # BLD AUTO: 1.8 K/UL (ref 1–4.3)
LYMPHOCYTES NFR BLD AUTO: 21.9 % (ref 20–40)
MCH RBC QN AUTO: 29.5 PG (ref 27–31)
MCHC RBC AUTO-ENTMCNC: 35.1 G/DL (ref 33–37)
MCV RBC AUTO: 83.9 FL (ref 81–99)
MONOCYTES # BLD: 0.6 K/UL (ref 0–0.8)
MONOCYTES NFR BLD: 7.1 % (ref 0–10)
NEUTROPHILS # BLD: 5.7 K/UL (ref 1.8–7)
NEUTROPHILS NFR BLD AUTO: 70.2 % (ref 50–75)
NRBC BLD AUTO-RTO: 0 % (ref 0–2)
PH UR STRIP: 5 [PH] (ref 5–8)
PLATELET # BLD: 181 K/UL (ref 130–400)
PMV BLD AUTO: 9.7 FL (ref 7.2–11.7)
PROT UR STRIP-MCNC: NEGATIVE MG/DL
RBC # BLD AUTO: 4.37 MIL/UL (ref 3.8–5.2)
RBC # UR STRIP: NEGATIVE /UL
SP GR UR STRIP: 1.02 (ref 1–1.03)
SQUAMOUS EPITHIAL: 2 /HPF (ref 0–5)
UROBILINOGEN UR-MCNC: NORMAL MG/DL (ref 0.2–1)
WBC # BLD AUTO: 8.1 K/UL (ref 4.8–10.8)

## 2019-04-08 PROCEDURE — 80053 COMPREHEN METABOLIC PANEL: CPT

## 2019-04-08 PROCEDURE — 96360 HYDRATION IV INFUSION INIT: CPT

## 2019-04-08 PROCEDURE — 81001 URINALYSIS AUTO W/SCOPE: CPT

## 2019-04-08 PROCEDURE — 99285 EMERGENCY DEPT VISIT HI MDM: CPT

## 2019-04-08 PROCEDURE — 86900 BLOOD TYPING SEROLOGIC ABO: CPT

## 2019-04-08 PROCEDURE — 85025 COMPLETE CBC W/AUTO DIFF WBC: CPT

## 2019-04-08 PROCEDURE — 76801 OB US < 14 WKS SINGLE FETUS: CPT

## 2019-04-08 PROCEDURE — 86850 RBC ANTIBODY SCREEN: CPT

## 2019-04-08 PROCEDURE — 84702 CHORIONIC GONADOTROPIN TEST: CPT

## 2019-04-08 NOTE — US
Date of service: 04/08/2019



Indication: PELVIC PAIN, VAG BLEEDING AFTER FALL



Comparison: Ob limited ultrasound performed 7/9/18 



Technique: Transabdominal pelvic ultrasound 



Findings: 



Uterus measures approximately 12.7 x 8.5 x 8.2 cm.  Anteverted.  

Cervix length measures approximately 2.9 cm. 



There is a single intrauterine fetus present.  4 mm yolk sac.  The 

gestational sac measures 3.7 cm and is compatible with a gestational 

age of 8 weeks 6 days.  The crown-rump length measures 2.4 cm and is 

compatible with a gestational age of 9 weeks 1 day. 



There is fetal heart motion which measured 156.6 BPM. 



The right ovary measures 2.9 x 2.0 x 2.8 cm.  The left ovary measures 

4.5 x 2.6 x 4.0 cm and contains evidence of 2.5 x 2.0 x 2.4 cm cyst.  

Blood flow was demonstrated to both ovaries. 



Impression: 



Live single intrauterine pregnancy with estimated gestational age 9 

weeks 1 day by crown-rump length calculation and 8 weeks 6 days by 

gestational sac calculation.  Fetal heart rate 156.6 bpm. 



Advise an anomaly screen at 16-18 weeks gestational age 



2.5 cm left ovarian cyst.

## 2019-04-08 NOTE — C.PDOC
History Of Present Illness


34 year old female presents to ED s/p slip and fall down the stairs. Patient 

states that she landed on her lower back and buttocks. She states that last 

night she experienced pelvic cramping. Patient is currently 10 weeks pregnant. G

:4, P:2, A: 1. Patient denies vaginal bleeding, nausea, vomiting, head injury, 

loss of consciousness, and back pain. 








- HPI


Time Seen by Provider: 04/08/19 11:38


Chief Complaint (Nursing): Trauma


History Per: Patient


History/Exam Limitations: no limitations


Onset/Duration Of Symptoms: Days (1)





- Fall


Fall:Prior To Injury: Slipped





Past Medical History


Reviewed: Historical Data, Nursing Documentation, Vital Signs


Vital Signs: 





                                Last Vital Signs











Temp  98.5 F   04/08/19 11:30


 


Pulse  82   04/08/19 11:30


 


Resp  18   04/08/19 11:30


 


BP  112/77   04/08/19 11:30


 


Pulse Ox  99   04/08/19 11:30














- Medical History


PMH: Gastrointestinal Ulcer (??)


   Denies: Anxiety, Atrial Fibrillation, Bipolar Disorder, Cardia Arrhythmia, 

CHF, Crohn's Disease, Depression, Diverticulitis, Gastritis, HTN, 

Hypercholesterolemia, Mitral Valve Prolapse, Pancreatitis, Paranoia, Post 

Traumatic Stress Disorder, Schizophrenia, Sexually Transmitted Disease


   Comment Only: Gall Bladder Disease (gallstones)


Surgical History: No Surg Hx


Family History: States: Unknown Family Hx





- Social History


Hx Tobacco Use: No


Hx Alcohol Use: No


Hx Substance Use: No





- Immunization History


Hx Tetanus Toxoid Vaccination: No


Hx Influenza Vaccination: No


Hx Pneumococcal Vaccination: No





Review Of Systems


Constitutional: Negative for: Fever, Chills, Weakness


Gastrointestinal: Negative for: Nausea, Vomiting


Genitourinary: Positive for: Pelvic Pain (cramping).  Negative for: Vaginal 

Bleeding


Musculoskeletal: Negative for: Back Pain


Neurological: Negative for: Weakness, Numbness, Dizziness





Physical Exam





- Physical Exam


Appears: Well, Non-toxic, No Acute Distress


Skin: Normal Color, Warm, Dry


Head: Atraumatic, Normacephalic


Neck: Normal ROM, Supple


Chest: Symmetrical, No Deformity


Cardiovascular: Rhythm Regular, No Murmur


Respiratory: No Accessory Muscle Use, No Rales, No Rhonchi, No Wheezing


Gastrointestinal/Abdominal: Soft, No Tenderness


Back: No Paraspinal Tenderness (lumbar tenderness)


Extremity: Capillary Refill (<2 seconds)


Neurological/Psych: Oriented x3, Normal Speech, Normal Cognition





ED Course And Treatment





- Laboratory Results


Result Diagrams: 


                                 04/08/19 12:25





                                 04/08/19 12:25


O2 Sat by Pulse Oximetry: 99 (in RA)





- CT Scan/US


  ** Pelvic US


Other Rad Studies (CT/US): Interpreted By Me, Read By Radiologist


CT/US Interpretation: Impression:  Live single intrauterine pregnancy with 

estimated gestational age 9 weeks 1 day by crown-rump length calculation and 8 

weeks 6 days by gestational sac calculation.  Fetal heart rate 156.6 bpm.  

Advise an anomaly screen at 16-18 weeks gestational age.  2.5 cm left ovarian 

cyst.


Progress Note: Labs ordered with CMP, CBC, and UA.  Pelvic US ordered.  Patient 

given IV fluids and tylenol PO.





Disposition


Counseled Patient/Family Regarding: Studies Performed, Diagnosis, Need For 

Followup





- Disposition


Referrals: 


Nathan Chawla MD [Staff Provider] - 


Disposition: HOME/ ROUTINE


Disposition Time: 14:25


Condition: STABLE


Additional Instructions: 


FOLLOW UP WITH YOUR OB/GYN WITHIN 1 WEEK





USE TYLENOL AS NEEDED FOR PAIN





RETURN TO ER IF SYMPTOMS WORSEN


Instructions:  Stomach Pain in Early Pregnancy


Forms:  [employer] [facility] (English)


Print Language: ENGLISH





- Clinical Impression


Clinical Impression: 


 Abdominal cramping affecting pregnancy








- Scribe Statement


The provider has reviewed the documentation as recorded by the Scribe (Elmira Lund)








All medical record entries made by the Scribe were at my direction and 

personally dictated by me. I have reviewed the chart and agree that the record 

accurately reflects my personal performance of the history, physical exam, 

medical decision making, and the department course for this patient. I have also

personally directed, reviewed, and agree with the discharge instructions and 

disposition.